# Patient Record
Sex: FEMALE
[De-identification: names, ages, dates, MRNs, and addresses within clinical notes are randomized per-mention and may not be internally consistent; named-entity substitution may affect disease eponyms.]

---

## 2020-10-12 ENCOUNTER — APPOINTMENT (OUTPATIENT)
Dept: ORTHOPEDIC SURGERY | Facility: CLINIC | Age: 70
End: 2020-10-12
Payer: MEDICARE

## 2020-10-12 VITALS
DIASTOLIC BLOOD PRESSURE: 84 MMHG | OXYGEN SATURATION: 97 % | SYSTOLIC BLOOD PRESSURE: 152 MMHG | HEART RATE: 94 BPM | HEIGHT: 62 IN | BODY MASS INDEX: 31.28 KG/M2 | WEIGHT: 170 LBS

## 2020-10-12 PROBLEM — Z00.00 ENCOUNTER FOR PREVENTIVE HEALTH EXAMINATION: Status: ACTIVE | Noted: 2020-10-12

## 2020-10-12 PROCEDURE — 73560 X-RAY EXAM OF KNEE 1 OR 2: CPT | Mod: LT

## 2020-10-12 PROCEDURE — 20610 DRAIN/INJ JOINT/BURSA W/O US: CPT | Mod: LT

## 2020-10-12 PROCEDURE — 99203 OFFICE O/P NEW LOW 30 MIN: CPT | Mod: 25

## 2020-10-20 ENCOUNTER — APPOINTMENT (OUTPATIENT)
Dept: ORTHOPEDIC SURGERY | Facility: CLINIC | Age: 70
End: 2020-10-20
Payer: MEDICARE

## 2020-10-20 PROCEDURE — 99213 OFFICE O/P EST LOW 20 MIN: CPT | Mod: 25

## 2020-10-20 PROCEDURE — 20610 DRAIN/INJ JOINT/BURSA W/O US: CPT | Mod: RT

## 2020-10-20 PROCEDURE — 99072 ADDL SUPL MATRL&STAF TM PHE: CPT

## 2020-10-20 NOTE — DATA REVIEWED
[de-identified] : 10/12/2020–bilateral knee x-rays (AP, lateral, Bello, sunrise): There are mild to moderate degenerative tricompartmental changes, most prominently at the patellofemoral space.\par \par Right knee MR (9/26/2020): There is a vertical/horizontal tear of the posterior horn of the medial meniscus.  There is grade 1 MCL sprain.  There is medial femoral condyle articular cartilage fraying.\par \par Left knee MRI (9/26/2020): There is a complex tear of the posterior horn and body of the medial meniscus with horizontal and radial components.  There is mild to moderate osteoarthritis of the patellofemoral compartment with full-thickness cartilage defects over the median patellar ridge and moderate cartilage thinning over the weightbearing portion of the medial femoral condyle.

## 2020-10-20 NOTE — PROCEDURE
[Injection] : Injection [Left] : of the left [Osteoarthritis] : Osteoarthritis [Bleeding] : bleeding [Infection] : infection [Patient] : patient [Risk] : risk [Benefits] : benefits [Verbal Consent Obtained] : verbal consent was obtained prior to the procedure [Alcohol] : Alcohol [Ethyl Chloride Spray] : ethyl chloride spray was used as a topical anesthetic [Lateral] : lateral [Direct] : direct [25] : a 25-gauge [1% Lidocaine___(mL)] : [unfilled] mL of 1% Lidocaine [Methylpred. 40mg/mL___(mL)] : [unfilled] mL of 40mg/mL methylprednisolone [Bandage Applied] : a bandage [Tolerated Well] : the patient tolerated the procedure well [None] : none [de-identified] : Complete resolution of pain postinjection.

## 2020-10-20 NOTE — HISTORY OF PRESENT ILLNESS
[FreeTextEntry1] : This is a 70-year-old female with no past medical history who has been having bilateral knee pain since May 2020.  She denies any related trauma.  She denies any constitutional symptoms.  Says her pain is localized to the medial and lateral aspect of both knees.  She has tried gabapentin twice daily, diclofenac twice daily which she stopped due to stomach irritation, currently on meloxicam 7.5 mg daily.  She reports 0 out of 10 pain at rest, and up to 10 out of 10 with ambulation (L>R), improved to 6 out of 10 with meloxicam.  She presents today with bilateral MRIs of her knees for review.

## 2020-10-20 NOTE — CONSULT LETTER
[Dear  ___] : Dear  [unfilled], [Courtesy Letter:] : I had the pleasure of seeing your patient, [unfilled], in my office today. [Please see my note below.] : Please see my note below. [Sincerely,] : Sincerely, [FreeTextEntry3] : Khris Cruz MD\par Musculoskeletal Oncology\par \par [address]\[place] [address]

## 2020-10-20 NOTE — DISCUSSION/SUMMARY
[de-identified] : This is a 70-year-old female with bilateral knee DJD, responded well to a left knee steroid injection and has responded well to a right-sided knee injection today.  I have described this condition in detail again with the patient and encouraged low impact activities.  I prescribed physical therapy.  She may return on a as needed basis.

## 2020-10-20 NOTE — PHYSICAL EXAM
[FreeTextEntry1] : General: well nourished, in no acute distress, alert and oriented to person, place and time.\par Psychiatric: normal mood and affect, no abnormal movements or speech patterns.\par Eyes: vision intact without deficits, sclera and conjunctiva were normal, pupils were equal in size. \par ENT: Ears and nose were normal in appearance. No thyromegaly.\par Lymph: no enlarged nodes, no lymphedema in extremity.\par Respiratory: Normal respiratory rhythm and effort. No wheezing detected without auscultation. No shortness of breath or respiratory distress.\par Cardiac: no cardiac related leg swelling, 2+ peripheral pulses.\par Neurology: normal gross sensation in extremities to light touch.\par Abdomen: soft, non-tender, tympanic, no masses.\par \par B/L LE:\par \par Skin CDI. No effusion, swelling, or ecchymosis. ROM: 0-120 degrees w/ pain at extremes. No varus/valgus instability. +medial and lateral joint line TTP. No TTP over quadriceps/patellar tendon. No TTP over tibial tubercle or pes insertion. No palpable masses. No lymphedema.\par Neg Lachman. Neg Cesar's. \par Alignment: 3 deg varus\par EHL/FHL/GS/TA 5/5. S/S/SP/DP/T SILT. Toes warm, BCR. Compartments soft.\par \par Gait: Patient is able to ambulate without assistance, although with bilateral antalgic gait.

## 2020-10-20 NOTE — HISTORY OF PRESENT ILLNESS
[FreeTextEntry1] : This is a 70-year-old female with no past medical history who returns for follow-up of bilateral knee degenerative arthritis.  She received a left knee steroid injection 1 week ago on 10/12/2020 and reports significant improvement.  Today she has returned for a right-sided knee injection.

## 2020-10-20 NOTE — PHYSICAL EXAM
[FreeTextEntry1] : General: well nourished, in no acute distress, alert and oriented to person, place and time.\par Psychiatric: normal mood and affect, no abnormal movements or speech patterns.\par Eyes: vision intact without deficits, sclera and conjunctiva were normal, pupils were equal in size. \par ENT: Ears and nose were normal in appearance. No thyromegaly.\par Lymph: no enlarged nodes, no lymphedema in extremity.\par Respiratory: Normal respiratory rhythm and effort. No wheezing detected without auscultation. No shortness of breath or respiratory distress.\par Cardiac: no cardiac related leg swelling, 2+ peripheral pulses.\par Neurology: normal gross sensation in extremities to light touch.\par Abdomen: soft, non-tender, tympanic, no masses.\par \par RLE:\par \par Skin CDI. No effusion, swelling, or ecchymosis. ROM: 0-120 degrees w/ pain at extremes. No varus/valgus instability. +medial and lateral joint line TTP. No TTP over quadriceps/patellar tendon. No TTP over tibial tubercle or pes insertion. No palpable masses. No lymphedema.\par Neg Lachman. Neg Cesar's. \par Alignment: 3 deg varus\par EHL/FHL/GS/TA 5/5. S/S/SP/DP/T SILT. Toes warm, BCR. Compartments soft.\par \par \par

## 2020-10-20 NOTE — PROCEDURE
[Injection] : Injection [Right] : of the right [Knee Joint] : knee joint [Osteoarthritis] : Osteoarthritis [Inflammation] : Inflammation [Bleeding] : bleeding [Infection] : infection [Patient] : patient [Benefits] : benefits [Risk] : risk [Verbal Consent Obtained] : verbal consent was obtained prior to the procedure [Alcohol] : Alcohol [Ethyl Chloride Spray] : ethyl chloride spray was used as a topical anesthetic [Lateral] : lateral [22] : a 22-gauge [1% Lidocaine___(mL)] : [unfilled] mL of 1% Lidocaine [Methylpred. 40mg/mL___(mL)] : [unfilled] mL of 40mg/mL methylprednisolone [Bandage Applied] : a bandage [Tolerated Well] : the patient tolerated the procedure well [None] : none [de-identified] : Improved symptoms post injection.

## 2020-10-20 NOTE — DISCUSSION/SUMMARY
[de-identified] : This is a 70-year-old female with bilateral knee degenerative osteoarthritis with MRI findings of medial meniscus tears.  I have had an extensive discussion regarding the natural history of the disease and the variety of surgical and non-surgical options available to the patient including, but not limited to non-steroidal anti-inflammatory medications, steroid injections, physical therapy, maintenance of ideal body weight, and reduction of activity.  She elected to proceed with a steroid injection to the left knee which she tolerated very well.  She may follow-up next week for the right side if she is feeling well.  I may prescribe physical therapy at that time.

## 2020-12-14 ENCOUNTER — APPOINTMENT (OUTPATIENT)
Dept: ORTHOPEDIC SURGERY | Facility: CLINIC | Age: 70
End: 2020-12-14
Payer: MEDICARE

## 2020-12-14 VITALS
HEIGHT: 62 IN | DIASTOLIC BLOOD PRESSURE: 83 MMHG | BODY MASS INDEX: 31.28 KG/M2 | OXYGEN SATURATION: 95 % | WEIGHT: 170 LBS | HEART RATE: 75 BPM | SYSTOLIC BLOOD PRESSURE: 137 MMHG

## 2020-12-14 DIAGNOSIS — M79.662 PAIN IN LEFT LOWER LEG: ICD-10-CM

## 2020-12-14 PROCEDURE — 99213 OFFICE O/P EST LOW 20 MIN: CPT

## 2020-12-14 PROCEDURE — 99072 ADDL SUPL MATRL&STAF TM PHE: CPT

## 2020-12-14 NOTE — PHYSICAL EXAM
[FreeTextEntry1] : General: well nourished, in no acute distress, alert and oriented to person, place and time.\par Psychiatric: normal mood and affect, no abnormal movements or speech patterns.\par Eyes: vision intact without deficits, sclera and conjunctiva were normal, pupils were equal in size. \par ENT: Ears and nose were normal in appearance. No thyromegaly.\par Lymph: no enlarged nodes, no lymphedema in extremity.\par Respiratory: Normal respiratory rhythm and effort. No wheezing detected without auscultation. No shortness of breath or respiratory distress.\par Cardiac: no cardiac related leg swelling, 2+ peripheral pulses.\par Neurology: normal gross sensation in extremities to light touch.\par Abdomen: soft, non-tender, tympanic, no masses.\par \par B/L LE:\par \par Skin CDI. No effusion, swelling, or ecchymosis. ROM: 0-120 degrees w/ pain at extremes. No varus/valgus instability. +Left calf TTP.  +medial and lateral joint line TTP. No TTP over quadriceps/patellar tendon. No TTP over tibial tubercle or pes insertion. No palpable masses. No lymphedema.\par Neg Lachman. Positive B/L Cesar's. \par Alignment: 3 deg varus\par EHL/FHL/GS/TA 5/5. S/S/SP/DP/T SILT. Toes warm, BCR. Compartments soft.\par \par Gait: Patient is able to ambulate without assistance, although with bilateral antalgic gait. \par \par

## 2020-12-14 NOTE — DISCUSSION/SUMMARY
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  She does get good relief with meloxicam therefore I recommended that she discuss taking this with her PCP in light of the side effects.  If she cannot take meloxicam she may consider bilateral knee arthroscopy (left side first) with partial medial meniscectomy.  I have described this procedure in detail including all of the risks and benefits.  There is no urgency to perform this procedure and this can be done at any time that is convenient.  We can also consider a repeat steroid injection in several months.  She will think about all of this and will get back to me with her decision.  In addition she has left calf pain and tenderness therefore I have ordered bilateral Dopplers to rule out DVT.

## 2020-12-14 NOTE — HISTORY OF PRESENT ILLNESS
[FreeTextEntry1] : This is a 70-year-old female with no past medical history who returns for FU of B/L chronic knee pain related to DJD/meniscus tears. She denies any recent trauma. She denies any constitutional symptoms. \par Had B/L knee steroid injections 2 months ago which offered excellent pain relief, but only lasted for 3 weeks. Now the pain has returned (L>R), 0/10 at rest, and up to 8/10 with ambulation. Meloxicam improves the pain to 4/10, however she has been avoiding this medication per the recommendations of her PCP when she was found to have hematuria. Has been going to PT biweekly which has helped.

## 2021-01-19 ENCOUNTER — APPOINTMENT (OUTPATIENT)
Dept: ORTHOPEDIC SURGERY | Facility: CLINIC | Age: 71
End: 2021-01-19
Payer: MEDICARE

## 2021-01-19 DIAGNOSIS — M23.303 OTHER MENISCUS DERANGEMENTS, UNSPECIFIED MEDIAL MENISCUS, RIGHT KNEE: ICD-10-CM

## 2021-01-19 DIAGNOSIS — M23.304 OTHER MENISCUS DERANGEMENTS, UNSPECIFIED MEDIAL MENISCUS, RIGHT KNEE: ICD-10-CM

## 2021-01-19 PROCEDURE — 99213 OFFICE O/P EST LOW 20 MIN: CPT

## 2021-01-19 PROCEDURE — 99072 ADDL SUPL MATRL&STAF TM PHE: CPT

## 2021-01-19 NOTE — PHYSICAL EXAM
[FreeTextEntry1] : General: well nourished, in no acute distress, alert and oriented to person, place and time.\par Psychiatric: normal mood and affect, no abnormal movements or speech patterns.\par Eyes: vision intact without deficits, sclera and conjunctiva were normal, pupils were equal in size. \par ENT: Ears and nose were normal in appearance. No thyromegaly.\par Lymph: no enlarged nodes, no lymphedema in extremity.\par Respiratory: Normal respiratory rhythm and effort. No wheezing detected without auscultation. No shortness of breath or respiratory distress.\par Cardiac: no cardiac related leg swelling, 2+ peripheral pulses.\par Neurology: normal gross sensation in extremities to light touch.\par Abdomen: soft, non-tender, tympanic, no masses.\par \par B/L LE:\par \par Skin CDI. No effusion, swelling, or ecchymosis. ROM: 0-120 degrees w/ pain at extremes. No varus/valgus instability. +medial joint line TTP B/L. No TTP over quadriceps/patellar tendon. No TTP over tibial tubercle or pes insertion. No palpable masses. No lymphedema.\par Neg Lachman. Positive B/L Cesar's. \par Alignment: 3 deg varus\par EHL/FHL/GS/TA 5/5. S/S/SP/DP/T SILT. Toes warm, BCR. Compartments soft.\par \par Gait: Patient is able to ambulate, now with a cane, with bilateral antalgic gait. \par \par  \par

## 2021-01-19 NOTE — HISTORY OF PRESENT ILLNESS
[FreeTextEntry1] : This is a 70-year-old female with no past medical history who returns for FU of B/L chronic knee pain related to DJD/meniscus tears. She denies any recent trauma. She denies any constitutional symptoms. \par Had B/L knee steroid injections 10/2020 which offered excellent pain relief, but returned 2 weeks later, (L>R), 0/10 at rest, and up to 8/10 with ambulation.  Has been taking meloxicam 7.5mg bid which does help but notes stomach aches related to this medication.  Her PCP is aware of this.

## 2021-01-19 NOTE — DISCUSSION/SUMMARY
[de-identified] : This is a 70-year-old female with bilateral medial meniscus tears in the setting of bilateral knee DJD.  I have explained the various nonoperative and operative management options.  I have discussed knee arthroscopy which may address the meniscus tears but will not treat the underlying arthritis.  In addition a partial meniscectomy may lead to more rapidly advancing knee DJD.  She has already failed bilateral knee steroid injections, therefore I have recommended left hyaluronic acid gel injection after obtaining authorization.  She will schedule a visit for this injection after getting authorization.

## 2021-01-25 RX ORDER — HYLAN G-F 20 16MG/2ML
16 SYRINGE (ML) INTRAARTICULAR
Qty: 1 | Refills: 0 | Status: ACTIVE | COMMUNITY
Start: 2021-01-25 | End: 1900-01-01

## 2021-01-29 ENCOUNTER — APPOINTMENT (OUTPATIENT)
Dept: ORTHOPEDIC SURGERY | Facility: CLINIC | Age: 71
End: 2021-01-29
Payer: MEDICARE

## 2021-01-29 PROCEDURE — 20610 DRAIN/INJ JOINT/BURSA W/O US: CPT | Mod: LT

## 2021-01-29 PROCEDURE — 99072 ADDL SUPL MATRL&STAF TM PHE: CPT

## 2021-01-29 NOTE — PROCEDURE
[Injection] : Injection [Left] : of the left [Knee Joint] : knee joint [Inflammation] : Inflammation [Bleeding] : bleeding [Infection] : infection [Patient] : patient [Risk] : risk [Benefits] : benefits [Verbal Consent Obtained] : verbal consent was obtained prior to the procedure [Alcohol] : Alcohol [Ethyl Chloride Spray] : ethyl chloride spray was used as a topical anesthetic [Lateral] : lateral [22] : a 22-gauge [2 mL Synvisc___(lot #)] : 2 mL Synvisc ~Ulot# [unfilled] [Bandage Applied] : a bandage [Ace Wrap] : an ace wrap [Tolerated Well] : the patient tolerated the procedure well [None] : none [de-identified] : Patient reports improved symptoms after injection

## 2021-01-29 NOTE — DISCUSSION/SUMMARY
[de-identified] : This is a 70-year-old female with bilateral knee DJD and meniscal tears responded well to her first Synvisc injection on the left knee.  She will come next week and the week after for her second and third injections.  If she responds well we may consider giving her right knee injections as well.

## 2021-01-29 NOTE — HISTORY OF PRESENT ILLNESS
[FreeTextEntry1] : This is a 70-year-old female with no past medical history who returns for FU of B/L chronic knee pain related to DJD/meniscus tears. She denies any recent trauma. She denies any constitutional symptoms. \par Had B/L knee steroid injections 10/2020 which offered excellent pain relief, but returned 2 weeks later, (L>R), 0/10 at rest, and up to 8/10 with ambulation. Was indicated for left knee synvisc injection at her last visit on 1/19/21. \par  \par

## 2021-01-29 NOTE — PHYSICAL EXAM
[FreeTextEntry1] : General: well nourished, in no acute distress, alert and oriented to person, place and time.\par Psychiatric: normal mood and affect, no abnormal movements or speech patterns.\par Eyes: vision intact without deficits, sclera and conjunctiva were normal, pupils were equal in size. \par ENT: Ears and nose were normal in appearance. No thyromegaly.\par Lymph: no enlarged nodes, no lymphedema in extremity.\par Respiratory: Normal respiratory rhythm and effort. No wheezing detected without auscultation. No shortness of breath or respiratory distress.\par Cardiac: no cardiac related leg swelling, 2+ peripheral pulses.\par Neurology: normal gross sensation in extremities to light touch.\par Abdomen: soft, non-tender, tympanic, no masses.\par \par B/L LE:\par \par Skin CDI. No effusion, swelling, or ecchymosis. ROM: 0-120 degrees w/ pain at extremes. No varus/valgus instability. +medial joint line TTP B/L. No TTP over quadriceps/patellar tendon. No TTP over tibial tubercle or pes insertion. No palpable masses. No lymphedema.\par Neg Lachman. Positive B/L Cesar's. \par Alignment: 3 deg varus\par EHL/FHL/GS/TA 5/5. S/S/SP/DP/T SILT. Toes warm, BCR. Compartments soft.\par \par \par \par

## 2021-02-05 ENCOUNTER — APPOINTMENT (OUTPATIENT)
Dept: ORTHOPEDIC SURGERY | Facility: CLINIC | Age: 71
End: 2021-02-05
Payer: MEDICARE

## 2021-02-05 PROCEDURE — 20610 DRAIN/INJ JOINT/BURSA W/O US: CPT | Mod: LT

## 2021-02-05 PROCEDURE — 99072 ADDL SUPL MATRL&STAF TM PHE: CPT

## 2021-02-05 NOTE — PROCEDURE
[de-identified] : Aspiration/Injection - Knee \par Procedure: Injection of the left knee joint. \par Indication:  Inflammation. Potential complications include bleeding and infection. \par Risk and benefits were discussed with the patient. Verbal consent was obtained prior to the procedure. \par Alcohol was used to prep the area. ethyl chloride spray was used as a topical anesthetic. Using sterile technique, the aspiration/injection needle was then directed from a lateral aspect. A 22-gauge was used to inject 2 mL Synvisc lot# Lot [id number], exp 10/2022. A bandage and an ace wrap was applied. the patient tolerated the procedure well and Patient reports improved symptoms after injection. \par Complications: none. \par

## 2021-02-05 NOTE — HISTORY OF PRESENT ILLNESS
[FreeTextEntry1] : This is a 70-year-old female with no past medical history who returns for FU of B/L chronic knee pain related to DJD/meniscus tears. She denies any recent trauma. She denies any constitutional symptoms. \par Had B/L knee steroid injections 10/2020 which offered excellent pain relief, but returned 2 weeks later. Had her first L knee synvisc injection last week, 1/29/21, with major improvements in pain, currently 3/10. She is here today for the second injection.

## 2021-02-05 NOTE — DISCUSSION/SUMMARY
[de-identified] : This is a 70-year-old female with bilateral knee DJD who has had a remarkable improvement response after the first Synvisc injection.  She tolerated the second injection well today.  She will return next week for her last injection of the left knee.  She is also indicated for a series of 3 injections on her right knee, pending insurance approval.

## 2021-02-05 NOTE — PHYSICAL EXAM
[FreeTextEntry1] : General: well nourished, in no acute distress, alert and oriented to person, place and time.\par Psychiatric: normal mood and affect, no abnormal movements or speech patterns.\par Eyes: vision intact without deficits, sclera and conjunctiva were normal, pupils were equal in size. \par ENT: Ears and nose were normal in appearance. No thyromegaly.\par Lymph: no enlarged nodes, no lymphedema in extremity.\par Respiratory: Normal respiratory rhythm and effort. No wheezing detected without auscultation. No shortness of breath or respiratory distress.\par Cardiac: no cardiac related leg swelling, 2+ peripheral pulses.\par Neurology: normal gross sensation in extremities to light touch.\par Abdomen: soft, non-tender, tympanic, no masses.\par \par B/L LE:\par \par Skin CDI. No effusion, swelling, or ecchymosis. ROM: 0-120 degrees w/ pain at extremes. No varus/valgus instability. No TTP over quadriceps/patellar tendon. No TTP over tibial tubercle or pes insertion. No palpable masses. No lymphedema.\par Neg Lachman. Positive B/L Cesar's. \par Alignment: 3 deg varus\par EHL/FHL/GS/TA 5/5. S/S/SP/DP/T SILT. Toes warm, BCR. Compartments soft.\par \par \par

## 2021-02-12 ENCOUNTER — APPOINTMENT (OUTPATIENT)
Dept: ORTHOPEDIC SURGERY | Facility: CLINIC | Age: 71
End: 2021-02-12
Payer: MEDICARE

## 2021-02-12 DIAGNOSIS — M25.561 PAIN IN RIGHT KNEE: ICD-10-CM

## 2021-02-12 DIAGNOSIS — M25.562 PAIN IN RIGHT KNEE: ICD-10-CM

## 2021-02-12 PROCEDURE — 20610 DRAIN/INJ JOINT/BURSA W/O US: CPT | Mod: LT

## 2021-02-12 PROCEDURE — 99072 ADDL SUPL MATRL&STAF TM PHE: CPT

## 2021-02-12 NOTE — DISCUSSION/SUMMARY
[de-identified] : This is a 70-year-old female with bilateral knee DJD who completed her third left knee Synvisc injection today, continues to do well.  We are awaiting authorization for similar injections to the right knee.

## 2021-02-12 NOTE — PROCEDURE
[de-identified] : Aspiration/Injection - Knee \par Procedure: Injection of the left knee joint. \par Indication: Inflammation. Potential complications include bleeding and infection. \par Risk and benefits were discussed with the patient. Verbal consent was obtained prior to the procedure. \par Alcohol was used to prep the area. ethyl chloride spray was used as a topical anesthetic. Using sterile technique, the aspiration/injection needle was then directed from a lateral aspect. A 22-gauge was used to inject 2 mL Synvisc lot# Lot [id number], exp 10/2022. A bandage and an ace wrap was applied. the patient tolerated the procedure well and Patient reports improved symptoms after injection. \par Complications: none.

## 2021-02-12 NOTE — PHYSICAL EXAM
[FreeTextEntry1] : General: well nourished, in no acute distress, alert and oriented to person, place and time.\par Psychiatric: normal mood and affect, no abnormal movements or speech patterns.\par Eyes: vision intact without deficits, sclera and conjunctiva were normal, pupils were equal in size. \par ENT: Ears and nose were normal in appearance. No thyromegaly.\par Lymph: no enlarged nodes, no lymphedema in extremity.\par Respiratory: Normal respiratory rhythm and effort. No wheezing detected without auscultation. No shortness of breath or respiratory distress.\par Cardiac: no cardiac related leg swelling, 2+ peripheral pulses.\par Neurology: normal gross sensation in extremities to light touch.\par Abdomen: soft, non-tender, tympanic, no masses.\par \par B/L LE:\par \par Skin CDI. No effusion, swelling, or ecchymosis. ROM: 0-120 degrees w/ pain at extremes. No varus/valgus instability. No TTP over quadriceps/patellar tendon. No TTP over tibial tubercle or pes insertion. No palpable masses. No lymphedema.\par Neg Lachman.  \par Alignment: 3 deg varus\par EHL/FHL/GS/TA 5/5. S/S/SP/DP/T SILT. Toes warm, BCR. Compartments soft.\par \par \par  \par

## 2021-02-12 NOTE — HISTORY OF PRESENT ILLNESS
[FreeTextEntry1] : This is a 70-year-old female with no past medical history who returns for FU of B/L chronic knee pain related to DJD/meniscus tears. She denies any recent trauma. She denies any constitutional symptoms. \par Had B/L knee steroid injections 10/2020 which offered excellent pain relief, but returned 2 weeks later. Had her first and second L knee synvisc injections 1/29/21 and 2/5/21, with major improvements in pain. She is here today for the third injection. Currently also awaiting authorization for the left knee injections.

## 2021-02-22 ENCOUNTER — APPOINTMENT (OUTPATIENT)
Dept: ORTHOPEDIC SURGERY | Facility: CLINIC | Age: 71
End: 2021-02-22
Payer: MEDICARE

## 2021-02-22 PROCEDURE — 20610 DRAIN/INJ JOINT/BURSA W/O US: CPT | Mod: RT

## 2021-02-22 PROCEDURE — 99072 ADDL SUPL MATRL&STAF TM PHE: CPT

## 2021-02-22 NOTE — DISCUSSION/SUMMARY
[de-identified] : 70-year-old female with bilateral knee DJD, status post Synvisc injection #1 to the right knee.  Follow-up next week for the second injection.

## 2021-02-22 NOTE — HISTORY OF PRESENT ILLNESS
[FreeTextEntry1] : This is a 70-year-old female with no past medical history who returns for FU of B/L chronic knee pain related to DJD/meniscus tears. She denies any recent trauma. She denies any constitutional symptoms. \par Had B/L knee steroid injections 10/2020 which offered excellent pain relief, but returned 2 weeks later. Had a series of 3 synvisc injections to the L knee, completed 2/12/21, with major improvements in pain. She is here today for similar injections to the right knee. \par  \par

## 2021-02-22 NOTE — PROCEDURE
[de-identified] : Aspiration/Injection - Knee \par Procedure: Injection of the right knee joint. \par Indication: Inflammation. Potential complications include bleeding and infection. \par Risk and benefits were discussed with the patient. Verbal consent was obtained prior to the procedure. \par Alcohol was used to prep the area. ethyl chloride spray was used as a topical anesthetic. Using sterile technique, the aspiration/injection needle was then directed from a lateral aspect. A 22-gauge was used to inject 2 mL Synvisc lot# Lot [id number], exp 10/2022. A bandage and an ace wrap was applied. the patient tolerated the procedure well and Patient reports improved symptoms after injection. \par Complications: none. \par

## 2021-03-01 ENCOUNTER — APPOINTMENT (OUTPATIENT)
Dept: ORTHOPEDIC SURGERY | Facility: CLINIC | Age: 71
End: 2021-03-01
Payer: MEDICARE

## 2021-03-01 PROCEDURE — 20610 DRAIN/INJ JOINT/BURSA W/O US: CPT | Mod: RT

## 2021-03-01 PROCEDURE — 99072 ADDL SUPL MATRL&STAF TM PHE: CPT

## 2021-03-01 NOTE — PHYSICAL EXAM
[FreeTextEntry1] : General: well nourished, in no acute distress, alert and oriented to person, place and time.\par Psychiatric: normal mood and affect, no abnormal movements or speech patterns.\par Eyes: vision intact without deficits, sclera and conjunctiva were normal, pupils were equal in size. \par ENT: Ears and nose were normal in appearance. No thyromegaly.\par Lymph: no enlarged nodes, no lymphedema in extremity.\par Respiratory: Normal respiratory rhythm and effort. No wheezing detected without auscultation. No shortness of breath or respiratory distress.\par Cardiac: no cardiac related leg swelling, 2+ peripheral pulses.\par Neurology: normal gross sensation in extremities to light touch.\par Abdomen: soft, non-tender, tympanic, no masses.\par \par B/L LE:\par \par Skin CDI. No effusion, swelling, or ecchymosis. ROM: 0-120 degrees w/ pain at extremes. No varus/valgus instability. No TTP over quadriceps/patellar tendon. No TTP over tibial tubercle or pes insertion. No palpable masses. No lymphedema.\par Neg Lachman. \par Alignment: 3 deg varus\par EHL/FHL/GS/TA 5/5. S/S/SP/DP/T SILT. Toes warm, BCR. Compartments soft.\par \par \par

## 2021-03-01 NOTE — HISTORY OF PRESENT ILLNESS
[FreeTextEntry1] : This is a 70-year-old female with no past medical history who returns for FU of B/L chronic knee pain related to DJD/meniscus tears. She denies any recent trauma. She denies any constitutional symptoms. \par Had B/L knee steroid injections 10/2020 which offered excellent pain relief, but returned 2 weeks later. Had a series of 3 synvisc injections to the L knee, completed 2/12/21, with major improvements in pain.  She had her first Synvisc injection last week and reports doing well however continues to have pain.

## 2021-03-01 NOTE — DISCUSSION/SUMMARY
[de-identified] : This is a 70-year-old female with bilateral knee DJD status post Synvisc injection #2 to the right knee.  Follow-up next week for the final injection.

## 2021-03-01 NOTE — PROCEDURE
[de-identified] : Aspiration/Injection - Knee \par Procedure: Injection of the right knee joint. \par Indication: Inflammation. Potential complications include bleeding and infection. \par Risk and benefits were discussed with the patient. Verbal consent was obtained prior to the procedure. \par Alcohol was used to prep the area. ethyl chloride spray was used as a topical anesthetic. Using sterile technique, the aspiration/injection needle was then directed from a lateral aspect. A 22-gauge was used to inject 2 mL Synvisc lot# Lot [id number], exp 10/2022. A bandage and an ace wrap was applied. the patient tolerated the procedure well and Patient reports improved symptoms after injection. \par Complications: none.

## 2021-03-08 ENCOUNTER — APPOINTMENT (OUTPATIENT)
Dept: ORTHOPEDIC SURGERY | Facility: CLINIC | Age: 71
End: 2021-03-08
Payer: MEDICARE

## 2021-03-08 DIAGNOSIS — M17.0 BILATERAL PRIMARY OSTEOARTHRITIS OF KNEE: ICD-10-CM

## 2021-03-08 PROCEDURE — 99072 ADDL SUPL MATRL&STAF TM PHE: CPT

## 2021-03-08 PROCEDURE — 20610 DRAIN/INJ JOINT/BURSA W/O US: CPT | Mod: RT

## 2021-03-08 NOTE — PHYSICAL EXAM
[FreeTextEntry1] : General: well nourished, in no acute distress, alert and oriented to person, place and time.\par Psychiatric: normal mood and affect, no abnormal movements or speech patterns.\par Eyes: vision intact without deficits, sclera and conjunctiva were normal, pupils were equal in size. \par ENT: Ears and nose were normal in appearance. No thyromegaly.\par Lymph: no enlarged nodes, no lymphedema in extremity.\par Respiratory: Normal respiratory rhythm and effort. No wheezing detected without auscultation. No shortness of breath or respiratory distress.\par Cardiac: no cardiac related leg swelling, 2+ peripheral pulses.\par Neurology: normal gross sensation in extremities to light touch.\par Abdomen: soft, non-tender, tympanic, no masses.\par \par B/L LE:\par \par Skin CDI. No effusion, swelling, or ecchymosis. ROM: 0-120 degrees w/ pain at extremes. No varus/valgus instability. No TTP over quadriceps/patellar tendon. No TTP over tibial tubercle or pes insertion. No palpable masses. No lymphedema.\par Neg Lachman. \par Alignment: 3 deg varus\par EHL/FHL/GS/TA 5/5. S/S/SP/DP/T SILT. Toes warm, BCR. Compartments soft.\par \par

## 2021-03-08 NOTE — HISTORY OF PRESENT ILLNESS
[FreeTextEntry1] : This is a 70-year-old female with no past medical history who returns for FU of B/L chronic knee pain related to DJD/meniscus tears. She denies any recent trauma. She denies any constitutional symptoms. \par Had B/L knee steroid injections 10/2020 which offered excellent pain relief, but returned 2 weeks later. Had a series of 3 synvisc injections to the L knee, completed 2/12/21, with major improvements in pain. Is here today for her final injection to the R knee.

## 2021-03-08 NOTE — PROCEDURE
[de-identified] : Aspiration/Injection - Knee \par Procedure: Injection of the right knee joint. \par Indication: Inflammation. Potential complications include bleeding and infection. \par Risk and benefits were discussed with the patient. Verbal consent was obtained prior to the procedure. \par Alcohol was used to prep the area. ethyl chloride spray was used as a topical anesthetic. Using sterile technique, the aspiration/injection needle was then directed from a lateral aspect. A 22-gauge was used to inject 2 mL Synvisc lot# Lot [id number], exp 10/2022. A bandage and an ace wrap was applied. the patient tolerated the procedure well and Patient reports improved symptoms after injection. \par Complications: none. \par

## 2021-03-08 NOTE — DISCUSSION/SUMMARY
[de-identified] : This is a 70-year-old female with bilateral knee DJD status post Synvisc injection #3 to the right knee. Doing very well with improvement in symptoms post injection. Rec'd weight loss, low impact activity, FU as needed.

## 2024-01-19 ENCOUNTER — NON-APPOINTMENT (OUTPATIENT)
Age: 74
End: 2024-01-19